# Patient Record
Sex: MALE | URBAN - METROPOLITAN AREA
[De-identification: names, ages, dates, MRNs, and addresses within clinical notes are randomized per-mention and may not be internally consistent; named-entity substitution may affect disease eponyms.]

---

## 2021-02-23 ENCOUNTER — OFFICE VISIT (OUTPATIENT)
Dept: URBAN - METROPOLITAN AREA CLINIC 17 | Facility: CLINIC | Age: 51
End: 2021-02-23
Payer: COMMERCIAL

## 2021-02-23 ENCOUNTER — DASHBOARD ENCOUNTERS (OUTPATIENT)
Age: 51
End: 2021-02-23

## 2021-02-23 DIAGNOSIS — R74.8 ELEVATED LIVER ENZYMES: ICD-10-CM

## 2021-02-23 DIAGNOSIS — A04.8 HELICOBACTER PYLORI (H. PYLORI): ICD-10-CM

## 2021-02-23 DIAGNOSIS — D64.9 NORMOCYTIC ANEMIA: ICD-10-CM

## 2021-02-23 DIAGNOSIS — Z21 ASYMPTOMATIC HIV INFECTION, WITH NO HISTORY OF HIV-RELATED ILLNESS: ICD-10-CM

## 2021-02-23 DIAGNOSIS — I10 ESSENTIAL HYPERTENSION: ICD-10-CM

## 2021-02-23 DIAGNOSIS — R16.0 LIVER MASS: ICD-10-CM

## 2021-02-23 DIAGNOSIS — K57.90 DIVERTICULOSIS: ICD-10-CM

## 2021-02-23 PROBLEM — 59621000: Status: ACTIVE | Noted: 2021-02-23

## 2021-02-23 PROBLEM — 397881000: Status: ACTIVE | Noted: 2021-02-23

## 2021-02-23 PROCEDURE — 99214 OFFICE O/P EST MOD 30 MIN: CPT | Performed by: INTERNAL MEDICINE

## 2021-02-23 RX ORDER — OMEPRAZOLE 20 MG/1
BI CAPSULE, DELAYED RELEASE ORAL BID
Status: ACTIVE | COMMUNITY

## 2021-02-23 RX ORDER — TADALAFIL 5 MG/1
1 TABLET AS NEEDED TABLET, FILM COATED ORAL ONCE A DAY
Status: ACTIVE | COMMUNITY

## 2021-02-23 RX ORDER — AMLODIPINE BESYLATE 10 MG/1
1 TABLET TABLET ORAL ONCE A DAY
Status: ACTIVE | COMMUNITY

## 2021-02-23 RX ORDER — DOLUTEGRAVIR SODIUM 25 MG/1
2 TABLETS TABLET, FILM COATED ORAL ONCE A DAY
Status: ACTIVE | COMMUNITY

## 2021-02-23 RX ORDER — DORAVIRINE 100 MG/1
1 TABLET TABLET, FILM COATED ORAL ONCE A DAY
Status: ACTIVE | COMMUNITY

## 2021-02-23 NOTE — HPI-OTHER HISTORIES
2/23/21 49 yo male pt of DR Brent Duran I am seeing him at the request of DR Zachariah Castro to discuss bx results. He had seen DR Castro at [facility] from 2/15 - 2/19 and EUS with liver bx taken by DR Jonatan fontenot.  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 Pt is understandably very anxious and his BP is elevated. Repeat at end of visit was 147/98 He is asymptomatic and at this time denies abdominal pain. he stopped taking Oxycodone 2 days ago.  I have reviewed several pages of hospital records.

## 2021-02-25 ENCOUNTER — TELEPHONE ENCOUNTER (OUTPATIENT)
Dept: URBAN - METROPOLITAN AREA SURGERY CENTER 30 | Facility: SURGERY CENTER | Age: 51
End: 2021-02-25